# Patient Record
Sex: FEMALE | Race: WHITE | NOT HISPANIC OR LATINO
[De-identification: names, ages, dates, MRNs, and addresses within clinical notes are randomized per-mention and may not be internally consistent; named-entity substitution may affect disease eponyms.]

---
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
## 2018-06-12 ENCOUNTER — COMMUNICATION - HEALTHEAST (OUTPATIENT)
Dept: TELEHEALTH | Facility: CLINIC | Age: 18
End: 2018-06-12

## 2018-06-12 ENCOUNTER — OFFICE VISIT - HEALTHEAST (OUTPATIENT)
Dept: FAMILY MEDICINE | Facility: CLINIC | Age: 18
End: 2018-06-12

## 2018-06-12 DIAGNOSIS — J30.2 SEASONAL ALLERGIES: ICD-10-CM

## 2018-06-12 DIAGNOSIS — F41.9 ANXIETY: ICD-10-CM

## 2018-06-12 DIAGNOSIS — R51.9 HEADACHE: ICD-10-CM

## 2018-06-12 LAB — TSH SERPL DL<=0.005 MIU/L-ACNC: 1.21 UIU/ML (ref 0.3–5)

## 2018-06-12 ASSESSMENT — MIFFLIN-ST. JEOR: SCORE: 1337.51

## 2018-06-13 ENCOUNTER — COMMUNICATION - HEALTHEAST (OUTPATIENT)
Dept: FAMILY MEDICINE | Facility: CLINIC | Age: 18
End: 2018-06-13

## 2018-06-19 ENCOUNTER — RECORDS - HEALTHEAST (OUTPATIENT)
Dept: ADMINISTRATIVE | Facility: OTHER | Age: 18
End: 2018-06-19

## 2018-10-24 ENCOUNTER — OFFICE VISIT - HEALTHEAST (OUTPATIENT)
Dept: FAMILY MEDICINE | Facility: CLINIC | Age: 18
End: 2018-10-24

## 2018-10-24 DIAGNOSIS — F41.9 ANXIETY: ICD-10-CM

## 2018-10-24 DIAGNOSIS — F32.A DEPRESSION: ICD-10-CM

## 2018-11-01 ENCOUNTER — COMMUNICATION - HEALTHEAST (OUTPATIENT)
Dept: FAMILY MEDICINE | Facility: CLINIC | Age: 18
End: 2018-11-01

## 2018-11-07 ENCOUNTER — OFFICE VISIT - HEALTHEAST (OUTPATIENT)
Dept: FAMILY MEDICINE | Facility: CLINIC | Age: 18
End: 2018-11-07

## 2018-11-07 DIAGNOSIS — F41.9 ANXIETY: ICD-10-CM

## 2018-11-15 ENCOUNTER — OFFICE VISIT - HEALTHEAST (OUTPATIENT)
Dept: FAMILY MEDICINE | Facility: CLINIC | Age: 18
End: 2018-11-15

## 2018-11-15 ENCOUNTER — OFFICE VISIT - HEALTHEAST (OUTPATIENT)
Dept: BEHAVIORAL HEALTH | Facility: CLINIC | Age: 18
End: 2018-11-15

## 2018-11-15 DIAGNOSIS — F41.9 ANXIETY: ICD-10-CM

## 2018-11-15 DIAGNOSIS — F41.0 PANIC ATTACKS: ICD-10-CM

## 2018-11-15 DIAGNOSIS — F41.1 GENERALIZED ANXIETY DISORDER: ICD-10-CM

## 2018-11-15 DIAGNOSIS — F41.0 PANIC ATTACK: ICD-10-CM

## 2018-11-15 DIAGNOSIS — F32.A DEPRESSION: ICD-10-CM

## 2018-11-15 DIAGNOSIS — F32.2 CURRENT SEVERE EPISODE OF MAJOR DEPRESSIVE DISORDER WITHOUT PSYCHOTIC FEATURES WITHOUT PRIOR EPISODE (H): ICD-10-CM

## 2018-11-15 RX ORDER — TRAZODONE HYDROCHLORIDE 50 MG/1
TABLET, FILM COATED ORAL
Qty: 60 TABLET | Refills: 1 | Status: SHIPPED | OUTPATIENT
Start: 2018-11-15

## 2018-11-21 ENCOUNTER — OFFICE VISIT - HEALTHEAST (OUTPATIENT)
Dept: BEHAVIORAL HEALTH | Facility: CLINIC | Age: 18
End: 2018-11-21

## 2018-11-21 DIAGNOSIS — F41.0 PANIC ATTACKS: ICD-10-CM

## 2018-11-21 DIAGNOSIS — F41.1 GENERALIZED ANXIETY DISORDER: ICD-10-CM

## 2018-11-21 DIAGNOSIS — F32.2 CURRENT SEVERE EPISODE OF MAJOR DEPRESSIVE DISORDER WITHOUT PSYCHOTIC FEATURES WITHOUT PRIOR EPISODE (H): ICD-10-CM

## 2018-12-05 ENCOUNTER — OFFICE VISIT - HEALTHEAST (OUTPATIENT)
Dept: BEHAVIORAL HEALTH | Facility: CLINIC | Age: 18
End: 2018-12-05

## 2018-12-05 ENCOUNTER — AMBULATORY - HEALTHEAST (OUTPATIENT)
Dept: BEHAVIORAL HEALTH | Facility: CLINIC | Age: 18
End: 2018-12-05

## 2018-12-05 ENCOUNTER — COMMUNICATION - HEALTHEAST (OUTPATIENT)
Dept: NURSING | Facility: CLINIC | Age: 18
End: 2018-12-05

## 2018-12-05 DIAGNOSIS — F32.2 CURRENT SEVERE EPISODE OF MAJOR DEPRESSIVE DISORDER WITHOUT PSYCHOTIC FEATURES WITHOUT PRIOR EPISODE (H): ICD-10-CM

## 2018-12-05 DIAGNOSIS — F41.1 GENERALIZED ANXIETY DISORDER: ICD-10-CM

## 2018-12-13 ENCOUNTER — AMBULATORY - HEALTHEAST (OUTPATIENT)
Dept: BEHAVIORAL HEALTH | Facility: CLINIC | Age: 18
End: 2018-12-13

## 2018-12-17 ENCOUNTER — AMBULATORY - HEALTHEAST (OUTPATIENT)
Dept: BEHAVIORAL HEALTH | Facility: CLINIC | Age: 18
End: 2018-12-17

## 2019-01-07 ENCOUNTER — COMMUNICATION - HEALTHEAST (OUTPATIENT)
Dept: TELEHEALTH | Facility: CLINIC | Age: 19
End: 2019-01-07

## 2019-01-07 ENCOUNTER — OFFICE VISIT - HEALTHEAST (OUTPATIENT)
Dept: FAMILY MEDICINE | Facility: CLINIC | Age: 19
End: 2019-01-07

## 2019-01-07 DIAGNOSIS — F41.9 ANXIETY: ICD-10-CM

## 2019-01-07 DIAGNOSIS — G44.209 MUSCLE CONTRACTION HEADACHE: ICD-10-CM

## 2019-01-07 DIAGNOSIS — F41.0 PANIC ATTACK: ICD-10-CM

## 2019-01-07 DIAGNOSIS — Z71.6 ENCOUNTER FOR TOBACCO USE CESSATION COUNSELING: ICD-10-CM

## 2019-01-07 RX ORDER — BUPROPION HYDROCHLORIDE 150 MG/1
150 TABLET ORAL EVERY MORNING
Qty: 30 TABLET | Refills: 1 | Status: SHIPPED | OUTPATIENT
Start: 2019-01-07

## 2019-01-07 RX ORDER — LORAZEPAM 0.5 MG/1
TABLET ORAL
Qty: 30 TABLET | Refills: 0 | Status: SHIPPED | OUTPATIENT
Start: 2019-01-07

## 2019-01-07 RX ORDER — NAPROXEN 500 MG/1
TABLET ORAL
Qty: 40 TABLET | Refills: 1 | Status: SHIPPED | OUTPATIENT
Start: 2019-01-07

## 2019-01-11 ENCOUNTER — OFFICE VISIT - HEALTHEAST (OUTPATIENT)
Dept: BEHAVIORAL HEALTH | Facility: CLINIC | Age: 19
End: 2019-01-11

## 2019-01-11 DIAGNOSIS — F32.2 CURRENT SEVERE EPISODE OF MAJOR DEPRESSIVE DISORDER WITHOUT PSYCHOTIC FEATURES WITHOUT PRIOR EPISODE (H): ICD-10-CM

## 2019-01-11 DIAGNOSIS — F41.1 GENERALIZED ANXIETY DISORDER: ICD-10-CM

## 2019-08-16 ENCOUNTER — AMBULATORY - HEALTHEAST (OUTPATIENT)
Dept: BEHAVIORAL HEALTH | Facility: CLINIC | Age: 19
End: 2019-08-16

## 2021-05-31 NOTE — PROGRESS NOTES
Discharge Summary    Session Type: Patient is presenting for an Individual session.    Dates of service 11/15/2018 to 1/16/2019  # Sessions completed: 3      Diagnosis at Intake  Generalized Anxiety Disorder  Major Depressive Disorder, single episode, severe              Historical Diagnoses: PTSD, Generalized Anxiety Disorder, Obsessive-Compulsive Disorder      Diagnosis at Discharge  1. Current severe episode of major depressive disorder without psychotic features without prior episode (H)    2. Generalized anxiety disorder    R/O Bipolar Disorder  R/O Psychosis unspecified      Progress toward goal 1: Decrease Anxiety  Unmet    Progress toward goal 2: Decrease depression  Unmet        Reason for discharge:Pt dropped out -stated she would be moving soon. Was a no-show and then never returned. Been inactive since January 2019. Likely moved as she reported planning on doing.     Prognosis at discharge:Poor    Recommendations and referrals at discharge: Follow up with PCP and take medications as prescribed.  Last session follow up was: patient to look into providers in [facility]. She knows a psychiatry office her father attends and plans to connect with them for an appointment. She also knows of a therapist office. We discussed the importance of scheduling appointments in advance so she has them established by the time she moves.           TODD Salgado     8/16/2019  3:43 PM

## 2021-06-01 VITALS — BODY MASS INDEX: 20.01 KG/M2 | WEIGHT: 124.5 LBS | HEIGHT: 66 IN

## 2021-06-02 VITALS — WEIGHT: 121 LBS | BODY MASS INDEX: 20.77 KG/M2

## 2021-06-02 VITALS — BODY MASS INDEX: 20.43 KG/M2 | WEIGHT: 119.02 LBS

## 2021-06-02 VITALS — BODY MASS INDEX: 19.91 KG/M2 | WEIGHT: 116 LBS

## 2021-06-02 VITALS — BODY MASS INDEX: 20.57 KG/M2 | WEIGHT: 118 LBS

## 2021-06-16 PROBLEM — Z78.9 USES BIRTH CONTROL: Status: ACTIVE | Noted: 2018-06-12

## 2021-06-16 PROBLEM — J30.2 SEASONAL ALLERGIES: Status: ACTIVE | Noted: 2018-06-12

## 2021-06-16 PROBLEM — F41.9 ANXIETY: Status: ACTIVE | Noted: 2018-06-12

## 2021-06-16 PROBLEM — F32.2 CURRENT SEVERE EPISODE OF MAJOR DEPRESSIVE DISORDER WITHOUT PSYCHOTIC FEATURES WITHOUT PRIOR EPISODE (H): Status: ACTIVE | Noted: 2018-11-20

## 2021-06-16 PROBLEM — F41.1 GENERALIZED ANXIETY DISORDER: Status: ACTIVE | Noted: 2018-11-20

## 2021-06-16 PROBLEM — F41.0 PANIC ATTACKS: Status: ACTIVE | Noted: 2018-11-20

## 2021-06-16 PROBLEM — R51.9 HEADACHE: Status: ACTIVE | Noted: 2018-06-12

## 2021-06-18 NOTE — PROGRESS NOTES
Subjective: Patient comes in the clinic for the first time she was born in [place] but lived in multiple areas as her father was in the [employer].  She went to high school in [place] most recently lived in [place] just moved to [place].  She works at [employer] she is outside some.    She has a history of some seasonal allergies uses over-the-counter eyedrops and loratadine    Is a history of headaches migraine in the past she uses occasional Excedrin Migraine.    She smokes she states of less than 1/2 pack/day for the last year no alcohol    No allergies to medicine    She has had no surgeries no hospitalizations.    Family history in mother for anxiety who is been on medications in the past also mother has had a thyroid cancer.    Father has anxiety and depression.    Patient's on double Provera shots for birth control    She has a younger sister and twin sister were both healthy.    Patient has a past history of some anxiety in high school, 10th through 12th grade she went to counseling was on some medicine.  She was on Lexapro and bupropion in the past.  She thinks she may have got a little dizziness from 1 of the medicines and she not sure how much they helped.  Although she does think it did help some she also thought the counseling helps some.    I had her fill out a SAW 7 that came back at 17.    No additional concerns or issues    Tobacco status: She  reports that she has been smoking.  She has been smoking about 0.25 packs per day. She has never used smokeless tobacco.    Patient Active Problem List    Diagnosis Date Noted     Seasonal allergies 06/12/2018     Headache 06/12/2018     Anxiety 06/12/2018     Birth control 06/12/2018       Current Outpatient Prescriptions   Medication Sig Dispense Refill     hydrOXYzine HCl (ATARAX) 25 MG tablet 1 po two times a day prn anxiety 30 tablet 0     sertraline (ZOLOFT) 100 MG tablet 1/2 po daily for 8 days then 1 po qd 30 tablet 1  "    No current facility-administered medications for this visit.        ROS: 10 point review of systems negative other than as outlined above    Objective:    /66 (Patient Site: Right Arm, Patient Position: Sitting, Cuff Size: Adult Regular)  Pulse 89  Temp 98.2  F (36.8  C) (Oral)   Resp 16  Ht 5' 5.75\" (1.67 m)  Wt 124 lb 8 oz (56.5 kg)  LMP 06/02/2018 (Exact Date)  SpO2 98% Comment: at rest with room air  BMI 20.25 kg/m2  Body mass index is 20.25 kg/(m^2).      General appearance no acute distress.    HEENT neck is supple oropharynx is clear pupils react normally extract movements full    Lungs clear no rales or rhonchi heart was regular in the 80s O2 sat 98%    No additional symptoms or concerns.    No results found for this or any previous visit.    Assessment:  1. Anxiety  sertraline (ZOLOFT) 100 MG tablet    Ambulatory referral to Psychology    Thyroid Stimulating Hormone (TSH)    hydrOXYzine HCl (ATARAX) 25 MG tablet   2. Headache     3. Seasonal allergies     4. Birth control       Anxiety we will treat with sertraline 100 mg half a day for 8 days and 1 a day thereafter recheck in 3-4 weeks    TSH pending    Use hydroxyzine as needed for more acute symptoms 25 mg 3 times daily as needed panic/anxiety attacks    Birth control stand Depo-Provera    Seasonal allergies okay to stay on eyedrops and loratadine.    Referral for counseling as well    Plan: As discussed above total time with the patient was 30 minutes, over 20 minutes was spent on counseling regarding her symptoms of anxiety treatment past history etc.    This transcription uses voice recognition software, which may contain typographical errors.  "

## 2021-06-21 NOTE — PROGRESS NOTES
Subjective: This patient comes in for evaluation this 18-year-old female was seen back on 10/24/2018 for anxiety and depression.    She was started on fluoxetine and trazodone.  In the past she has had problems on medication including sertraline and she thought Lexapro.  She also tried BuSpar without help    Patient had a high PHQ 9 and a high SAW 7 score at that time.    She was placed on fluoxetine 10 mg a day and increasing up to 20 mg a day after week also trazodone 50 mg 1/2-1 tablet at bedtime    She states that she is not sure if the trazodone was helping that much.  But she definitely felt like the fluoxetine started to give her some panic attacks.  She was seen at the emergency room at [facility] on October 30 and then up in [place] at [place] the following day.  She was treated with Ativan.  She was given some Ativan 0.5 mg to take 1 or 2 a day she is given 10 tablets and is now finished those.    She has had ongoing panic attacks especially at bedtime and sometimes she will wake up around 4-5 AM  She has a family history for anxiety.    Denies any suicidal thoughts or tendencies.    Having difficulty working.  Seems to have more symptoms at night.    She does have an appointment later in the month for diagnostic assessment, we were able to move that up to November 15.    Tobacco status: She  reports that she has quit smoking. She smoked 0.25 packs per day. She has never used smokeless tobacco.    Patient Active Problem List    Diagnosis Date Noted     Seasonal allergies 06/12/2018     Headache 06/12/2018     Anxiety 06/12/2018     Birth control 06/12/2018       Current Outpatient Prescriptions   Medication Sig Dispense Refill     famotidine (PEPCID) 20 MG tablet Take 1 tablet (20 mg total) by mouth 2 (two) times a day. 30 tablet 0     LORazepam (ATIVAN) 0.5 MG tablet 1 po two times a day as needed for panic attacks 20 tablet 0     PARoxetine (PAXIL) 20 MG tablet 1/2 po daily for 7 days then 1 po  qd 30 tablet 2     traZODone (DESYREL) 50 MG tablet Take 1 tablet (50 mg total) by mouth at bedtime. 30 tablet 1     No current facility-administered medications for this visit.        ROS:   10 point review of systems positive as outlined otherwise negative    Objective:    /68 (Patient Site: Left Arm, Patient Position: Sitting, Cuff Size: Adult Regular)  Pulse 76  Temp 97.7  F (36.5  C) (Oral)   Wt 119 lb 0.3 oz (54 kg)  LMP 10/08/2018 (Exact Date)  Breastfeeding? No  BMI 20.43 kg/m2  Body mass index is 20.43 kg/(m^2).    General appearance quiet    Vital signs are stable    Heart rate was 97 rate is regular    Lungs are clear no rales or rhonchi neck negative no adenopathy    Weight is down 6 pounds  from August.     Abdomen soft nontender no guarding rebound    Extremities negative no swelling        Assessment:  1. Anxiety  PARoxetine (PAXIL) 20 MG tablet    LORazepam (ATIVAN) 0.5 MG tablet     Ongoing anxiety as the main symptom    We will start Paxil 20 mg can initially started at a 10 mg dose for a week and then go up to 20 mg.    I did give her lorazepam 0.5 mg 1 twice daily as needed panic attack.  She understands that this is an addictive medicine and will not be used long-term.    I do think she should restart on her trazodone at bedtime    Plan: Await diagnostic assessment.  She does have a referral to psychiatry.  Follow-up in 2-3 weeks with me unless she gets into psychiatry quickly    This transcription uses voice recognition software, which may contain typographical errors.

## 2021-06-21 NOTE — PROGRESS NOTES
Subjective: This patient comes in for evaluation and follow-up.  She is a 18-year-old female who has a history of anxiety.  She was seen in June.  Was treated with sertraline and hydroxyzine at that time.  Unfortunately she got a headache from the sertraline after taking it for a short time and also felt like she had some side effects where she felt like she had to gasp for breath and felt jumpy just prior to sleep when she took hydroxyzine.    I referred her to psychology for counseling.  She states that where we sent her did not take her insurance, .    She got so busy she did not follow-up.  She felt like she was doing okay for a while but over the last couple months now has worsened.    Her symptoms are more in line with depression at this point.    Her SAW 7 score is 17 but PHQ 9 score is 23    On question #9 she scored a 2.  She states that in high school she had some self-harm behavior where she cut herself.  She has had some thoughts coming back regarding that and regarding hurting herself by cutting.  No suicidal thoughts.  She has no action plan.  She does feel safe.  We did discuss if she did not feel safe, would like her to go to [facility].  She said she would but reassured me that presently she is not in that situation.  Her graph there is a family history for anxiety in mom and anxiety and depression in dad.    Appearance had  1-2 years ago    She has a twin sister who does not have mental health and she has a younger sister who is been on some medicine for depression.    Patient continues to work.    Please see my initial and only visit with her back in June.    She now is having more difficulty with lower energy healing bad about herself and trouble concentrating little interest in things and feeling down and depressed and hopeless.    She did want to try different medication    I discussed with her I like her to see psychologist for diagnostic assessment as well as  individual psychotherapy.  And also have her see a psychiatrist.  We did start the process of getting her into see them.    In the meantime we discussed options for medication and elected to have her go on fluoxetine 20 mg a day she will start on half a tablet a day for a week and then go up to a whole tablet    Also will take trazodone 50 mg, 1/2-1 tablet at bedtime.    Discussed rationale behind treatment.        Tobacco status: She  reports that she has been smoking.  She has been smoking about 0.25 packs per day. She has never used smokeless tobacco.    Patient Active Problem List    Diagnosis Date Noted     Seasonal allergies 06/12/2018     Headache 06/12/2018     Anxiety 06/12/2018     Birth control 06/12/2018       Current Outpatient Prescriptions   Medication Sig Dispense Refill     famotidine (PEPCID) 20 MG tablet Take 1 tablet (20 mg total) by mouth 2 (two) times a day. 30 tablet 0     FLUoxetine (PROZAC) 20 MG tablet Take 1 tablet (20 mg total) by mouth daily. 30 tablet 1     traZODone (DESYREL) 50 MG tablet Take 1 tablet (50 mg total) by mouth at bedtime. 30 tablet 1     No current facility-administered medications for this visit.        ROS:   10 point review of systems positive as outlined otherwise negative    Objective:    /60 (Patient Site: Left Arm, Patient Position: Sitting, Cuff Size: Adult Regular)  Pulse 96  Wt 121 lb (54.9 kg)  LMP 10/08/2018 (Exact Date)  Breastfeeding? No  BMI 20.77 kg/m2  Body mass index is 20.77 kg/(m^2).  The following are part of a depression follow up plan for the patient:  under care of mental health team    General appearance reserved    Vital signs are stable    No additional exam was done.        Assessment:  1. Depression  Ambulatory referral to Psychology    Ambulatory referral to Psychiatry    FLUoxetine (PROZAC) 20 MG tablet    traZODone (DESYREL) 50 MG tablet   2. Anxiety  Ambulatory referral to Psychology    Ambulatory referral to Psychiatry     FLUoxetine (PROZAC) 20 MG tablet    traZODone (DESYREL) 50 MG tablet     Total time with patient 25 minutes over 20 minutes spent in counseling reviewing past history past medications, previous evaluation here    Also discussed plans and coordinated care.    Also discussed medication side effect profile risk-benefit    Plan: As outlined above.  We will have her follow-up here in 3-4 weeks for recheck    Call earlier if any problems    Discussed plan if symptoms worsen.  Please see above, being seen at [facility] emergency room.  This transcription uses voice recognition software, which may contain typographical errors.

## 2021-06-21 NOTE — PROGRESS NOTES
"Mental Health Visit Note    11/21/2018    Start time: 3:00pm    Stop Time: 3:57pm   Session # 1    Session Type: Patient is presenting for an Individual session.    Wendy Correa is a 18 y.o. female is being seen today for    Chief Complaint   Patient presents with     MH Follow Up     Patient presented for follow up psychotherapy visit to address symptoms of depression, anxiety and panic attacks.   .     New symptoms or complaints: None    Functional Impairment:   Personal: 4  Family: 4  Work: 3  Social:4    Clinical assessment of mental status:   Grooming: Well groomed  Attire: Appropriate  Age: Appears Stated  Behavior Towards Examiner: Cooperative  Motor Activity: anxious- fidgeting   Eye Contact: fleeting  Mood: Anxious  Affect: Congruent w/content of speech, Anxious and Depressed  Speech/Language: Within normal and Soft  Attention: Within normal  Concentration: Brief  Thought Process: Within normal  Thought Content: Hallucinations: Within noraml  Delusions: Within normal  Orientation: X 3  Memory: No Evidence of Impairment  Judgement: No Evidence of Impairment  Estimated Intelligence: Average  Demonstrated Insight: Adequate  Fund of Knowledge: adequate       Suicidal/Homicidal Ideation present: None Reported This Session. Denies SI/HI.    Patient's impression of their current status: Patient states she stopped taking the Venlafaxine that was prescribed by PCP because it made her vomit. She has taken Ativan every other day. She reports taking 2 last night. She reports experiencing body image concerns due to what she went through in high school. She also reports that anxiety has caused a decrease in appetite. She expresses a desire to gain weight. She states she doesn't have a fear of gaining weight. She has tried to gain weight. She reports the excessive cleaning she does is due to anxious agitation and restlessness. When she gets \"emotional,\" she cleans and scrubs things that are already clean. She has a " "typical cleaning routine she follows 2 times a week. Her racing thoughts are also related to anxiety and fears something awful will happen such as \"I'm going to have a stroke, seizure...what if I hit my head.\" She has fears that something cold be wrong with her health. She is afraid of heights, the dark, being alone, messing up, saying the wrong thing, her appearance/what people think. She denies any mood swings related to feelings of elation. She notes it is more of a feeling of being \"on edge\" She reports anxious or anger \"mood swings\" as she has a tendency to hold in anger. She has been lacking motivation and reports anhedonia. She notes it takes all of her energy to get out of bed. She has recently started a journal and has been researching how to work through panic attacks.     Therapist impression of patients current state: Patient presented for follow up individual psychotherapy visit. Patient was well-groomed, alert and oriented. Patient's body language included anxious agitation, as well as some hesitation and avoidance of eye contact at times. Patient presents with symptoms of anxiety and depression.  It appears that many of her presenting symptoms and problems are related to her anxiety. Based on her further explanation of restricted eating and weight loss, therapist has ruled out Anorexia. Therapist provided positive reinforcement for new journaling practice. In session, we explored other activities she could engage in when feeling anxious agitation or restlessness.   Therapist and patient worked on a working through panic attacks worksheet. Patient took it with her to further review and complete outside of session. She was able to understand the concept of not resisting the panic attacks, but having some acceptance and acknowledging the biological response that happens in her body. She will practice the coping skills for panic attacks over the next couple of weeks.       Type of psychotherapeutic " technique provided: Insight oriented, Client centered and relaxation techniques, education on neurology, skills for coping with panic attacks    Progress toward short term goals:Progress as expected, presented for therapy session, was open and engaged in session. Was receptive to learning new skills. Has been doing her own research as well in regards to managing anxiety- started journaling and plans to journal about panic attacks to increase awareness and understanding of triggers.    Review of long term goals: Not done at today's visit. Will develop initial long-term treatment plan at next session as this was the first follow up visit.     Diagnosis:   1. Generalized anxiety disorder    2. Current severe episode of major depressive disorder without psychotic features without prior episode (H)    3. Panic attacks        Plan and Follow up: Patient is scheduled for follow up psychotherapy on 12/05/2018      Discharge Criteria/Planning: Patient will continue with follow-up until therapy can be discontinued without return of signs and symptoms.    Flora Valdez LICSW 11/21/2018

## 2021-06-21 NOTE — PROGRESS NOTES
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 sitting across the room and noted that no one else saw him. She has moments of feeling paralyzed in her sleep. In high school, she saw \"shadow people\" and dreams of death and torture. She felt like she was lifted out of bed and could physically feel herself being lifted. She continues to report having the voice in her head that is mentioned above from previous therapy provider. The voice, \"Patch\" is her \"protector\" and has been with her since 6th grade. He talks to her about how she has a purpose and is the same age as her. She endorses disordered thinking, racing thoughts, and paranoia symptoms. Therapist highly suspects presents of psychotic disorder, but will require further assessment of patient's symptoms due to differential diagnoses. R/O Psychosis, unspecified. Differential diagnoses: Schizoaffective disorder; Schizophrenia; Delusional Disorder; Dissociative Identity Disorder.       Recommendations   Patient would benefit from continued psychotherapy services every 1-2 weeks to further address presenting symptoms and concerns. Patient may also benefit from the following services and referrals:     Psychotherapy    Psychiatry- " psychotropic medication management    Neurology - headaches.     Neuropsych evaluation or psychological testing    Relaxation skills to manage anxiety and panic attacks    Sleep study - insomnia and nightmares    Physical activity for anxiety management       Diagnosis  Generalized Anxiety Disorder  Major Depressive Disorder, single episode, severe   Historical Diagnoses: PTSD, Generalized Anxiety Disorder, Obsessive-Compulsive Disorder    Provisional Diagnosis   R/O Panic Disorder   R/O Agoraphobia  R/O Psychosis, unspecified. Differential diagnoses: Schizoaffective disorder; Schizophrenia; Delusional Disorder. Dissociative Identify Disorder.  R/O Eating Disorder    Sources/references used in completing this assessment:   -Face to face interview  -Patient Chart  -Measures completed: WHODAS, PHQ-9, SAW-7, C-SSRS, CAGE   WHODAS 2.0 12-item version: 16 or 33%  Scores presented in qualifiers to represent level of disability.  MODERATE Problem (medium, fair, ...) 25-49%  H1= 25  H2= 12  H3= 15    PHQ-9: 20   SAW-7: 20  Mood (Current): 3  C-SSRS: Moderate risk  CAGE: 0/4      Assessment of client resolving presenting mental health concerns:  Ability  [] low     [x] average     [] high  Motivation [] low     [x] average     [] high  Willingness [] low     [x] average     [] high    Initial Assessment Objectives   1. Patient and therapist will develop therapeutic relationship.  2. Patient to present for follow up appointment to initiate psychotherapy services.  3. Patient to continue to follow up with primary care physician as needed and take medications as prescribed.  4. Develop comprehensive treatment plan.   5. Refer to psychiatry services for medication management. Specialty  was assisting her in getting scheduled for psychiatry with a community clinic today.       Is patient's family involved in the treatment?  [x] No     [] Yes  If yes, How?  Patient did not request family involvement at this time.    If  no, Why?  Patient did not request family involvement at this time.        Therapist s Signature/Supervision/co-signature statement:   Flora Valdez, LICSW 11/15/2018

## 2021-06-21 NOTE — PROGRESS NOTES
"Subjective: Patient comes in for follow-up.  She has had some ongoing problems with her anxiety.  Please see previous discussion regarding panic attacks.  We had her continue on trazodone use Ativan 0.5 mg twice daily and take paroxetine.  She only took it for a week she started that on 11/7/2018 she is been off of the 2 days.  She felt \"out of body \"when she was on it.    She is now failed a number of SSRIs including sertraline Lexapro fluoxetine and Paxil.  Mainly because of side effects.  She also thinks she is been on BuSpar in the past    She does see counseling here today and we do have a referral in for her to see a psychiatrist.    She is continued to have a panic attacks every other day.  It can occur anytime mostly at night but sometimes at work and will take a couple hours for her to calm down.    After discussion elected to refill the Ativan 20 tablets continue trazodone at bedtime start on venlafaxine 37.5 mg 1 a day for 7 days then go to 2 a day.  Recheck in 3 weeks here if she has not seen a psychiatrist by then    Tobacco status: She  reports that she has quit smoking. She smoked 0.25 packs per day. she has never used smokeless tobacco.    Patient Active Problem List    Diagnosis Date Noted     Seasonal allergies 06/12/2018     Headache 06/12/2018     Anxiety 06/12/2018     Birth control 06/12/2018       Current Outpatient Medications   Medication Sig Dispense Refill     LORazepam (ATIVAN) 0.5 MG tablet 1 po two times a day as needed for panic attacks. 20 tablet 0     traZODone (DESYREL) 50 MG tablet 1-2 po qhs. 60 tablet 1     famotidine (PEPCID) 20 MG tablet Take 1 tablet (20 mg total) by mouth 2 (two) times a day. 30 tablet 0     venlafaxine (EFFEXOR XR) 37.5 MG 24 hr capsule 1 po daily for 7 days then 2 po qd. 60 capsule 1     No current facility-administered medications for this visit.        ROS: Review of systems positive as outlined above otherwise negative    Objective:    /64 (Patient " Site: Right Arm, Patient Position: Sitting, Cuff Size: Adult Regular)   Pulse 72   Wt 116 lb (52.6 kg)   BMI 19.91 kg/m    Body mass index is 19.91 kg/m .      No additional exam was done        Assessment:  1. Anxiety  venlafaxine (EFFEXOR XR) 37.5 MG 24 hr capsule    LORazepam (ATIVAN) 0.5 MG tablet    traZODone (DESYREL) 50 MG tablet   2. Panic attack  venlafaxine (EFFEXOR XR) 37.5 MG 24 hr capsule    LORazepam (ATIVAN) 0.5 MG tablet   3. Depression  traZODone (DESYREL) 50 MG tablet     Total time with patient 15 minutes entire time spent in counseling reviewing medication side effects treatment options for her anxiety and panic attacks    Plan: Please see above    This transcription uses voice recognition software, which may contain typographical errors.

## 2021-06-22 NOTE — PROGRESS NOTES
Mental Health Visit Note    12/5/2018    Start time: 2:00pm    Stop Time: 3:40pm   Session # 2    Session Type: Patient is presenting for an Individual session.    Wendy Correa is a 18 y.o. female is being seen today for    Chief Complaint   Patient presents with     MH Follow Up     Patient presented for follow up psychotherapy to address symptoms of depression and anxiety with cutting and SI.    .     New symptoms or complaints: suicidal    Functional Impairment:   Personal: 4  Family: 4  Work: 3  Social:4    Clinical assessment of mental status:   Grooming: Well groomed  Attire: Appropriate  Age: Appears Stated  Behavior Towards Examiner: Cooperative  Motor Activity: Within normal   Eye Contact: downcast  Mood: Anxious  Affect: Congruent w/content of speech, Flat and Depressed  Speech/Language: Within normal and Soft  Attention: Within normal  Concentration: Brief  Thought Process: Within normal  Thought Content: Hallucinations: Within noraml  Delusions: Within normal  Orientation: X 3  Memory: No Evidence of Impairment  Judgement: No Evidence of Impairment  Estimated Intelligence: Average  Demonstrated Insight: Adequate  Fund of Knowledge: adequate       Suicidal/Homicidal Ideation present: Yes: reports SI. Plan to overdose on pills when boyfriend is gone and also cut straight up her arm. Reports she will have full intent if she feels she can't take it anymore. However, unable to say when her breaking point will be. PAtient does not contract for safety. She also noted preparotry behavior of planning to write a goodbye/apology letter . Updated C-SSRS and assessed suicide severity. View additional document for crisis assessment/transport hold..     Patient's impression of their current status: Patient states she stopped all of her psychotropic medications. She is out of the Ativan. She has had 4 panic attacks since our last visit. She is unable to identify triggers. She endorsed the following symptoms of  "panic: dizziness, increased heart rate, shortness of breath, tingling. She dmitri by talking to her friend who tell her she is safe and helps ground her. Patient reports her depression was \"really bad\" on Saturday and she engaged in self-harm (cutting). She was \"stuck in my head\" with \"self-hate\" and thought of not being enough. She reports that she is not happy.  She doesn't feel like she can talk to her boyfriend about her mental health as he says things that are \"shaming.\" Her depression is worse when she is alone and not doing anything. She asked therapist what she should do \"if Saturday happens again?\" She wonders where she can go because she doesn't trust herself in regards to her safety. She would like to fix her car, end her unhappy relationship, go to college, get her own place and focus on herself and bettering her life. She was able to identify some enjoyable activities of art and reading. However, she destroyed her art project on Saturday when she was dysregulated and can't concentrate enough to read.     Therapist impression of patients current state: Patient presented for follow up individual psychotherapy visit. Patient appeared depressed and affect was flat. 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 She expressed trust in the therapeutic relationship by openly sharing about her thoughts and feelings, including worsening depression and suicidal thoughts. She appears to be struggling to cope with her intrusive negative thoughts. We reviewed 3 supports she could reach out to for help when needed as well as additional mental health crisis supports. She stated 2 of those friends aren't always available. She completed a safety plan including warning signs, coping strategies, and supports she can contact. She took the copy with her to have available when symptoms are severe (therapist forgot to make a photocopy to scan into EMR). We discussed taking a proactive approach by scheduling dates with " friends or other activities when she knows she will be home alone and stuck in her thoughts, such as on the weekends when she is off work. She may beTherapist is concerned about patient's safety due to her need to stay overnight with a friend on Saturday due to cutting behaviors and suicidal thoughts and plan. It is also concerning that patient does not feel supported by her boyfriend with whom she resides. Therefore, she is not likely to reach out to him for help and support. Therapist and patient aligned to get patient further assessment and support at the ED. Patient showed trust in the therapeutic relationship and a willingness to seek help to manage her symptoms. She may benefit from partial hospitalization or at least further hospitalization for stabilization on medications as she is no longer taking any psychotropic medications.     Type of psychotherapeutic technique provided: Insight oriented, Client centered and relaxation techniques, education on neurology, skills for coping with panic attacks    Progress toward short term goals:Progress as expected, presented for therapy session, was open and engaged in session. Was receptive to learning new skills. Has been doing her own research as well in regards to managing anxiety- started journaling and plans to journal about panic attacks to increase awareness and understanding of triggers.    Review of long term goals: Treatment Plan updated and Effective 12/05/2018-03/05/2019.     Diagnosis:   1. Current severe episode of major depressive disorder without psychotic features without prior episode (H)    2. Generalized anxiety disorder          Plan and Follow up: Patent was taken to [facility] for further crisis evaluation and is under their care. Patient is scheduled for follow up psychotherapy on 12/13/2018, 12/28/18.      Discharge Criteria/Planning: Patient will continue with follow-up until therapy can be discontinued without return of signs and  symptoms.    Flora Valdez [facility] 12/5/2018

## 2021-06-22 NOTE — PROGRESS NOTES
"Patient reports suicidal ideation. Provider assessed suicide severity. Patient verbalizes intent to act with a plan for self-harm. Reported a suicide plan of overdosing on the pills she has at home when her boyfriend is gone from the house. She also has been engaging in cutting and contemplating cutting straight up her arm in an attempt of suicide. She is unable to contract for safety. She expressed that she is unsure when she will reach her \"breaking point.\" She is worried about following through on the above mentioned plan. She said, \"I will have the full intent (to commit suicide) if I don't feel like I can take anymore.\" She has been thinking about writing a goodbye and apology letter \"for what I've done and not done\" as a preparatory behavior for suicide. Provider and patient discussed getting further evaluation and support for her. She was receptive of the idea and willing to go to the ED for further assessment. Therapist would like patient assessed for safety due to reported SI and severe depression symptoms. Provider followed mental health emergency response process and procedures. 911 was called, a transport hold was completed, and patient was taken to [facility] by emergency responders. Patient went without incident. She endorsed symptoms of anxiety when responders arrived and was supported by therapist in working through anxiety and transferring care to the EMS. Provider contacted ED physician and ED crisis social workers to notify of suicidal patient arriving in ambulance for further crisis evaluation and possible hospital admit for stabilization. She is not on any psychotropic medications at this time as she stopped Effexor and Trazodone and ran out of Ativan. A transport hold, face sheet and med list was provided to the EMS to share with ED.      "

## 2021-06-22 NOTE — PROGRESS NOTES
[facility] outpatient mental health schedulers unable to contact patient about rescheduling psychiatry. Patient did not answer and her VM is full. Therapist and patient will have to discuss in person when patient returns for therapy visit.

## 2021-06-22 NOTE — PROGRESS NOTES
"Patient was a same-day cancellation/no-show for psychotherapy today. Therapist also noticed patient cancelled her outpatient psychiatry appointment and another psychotherapy appointment. Called patient to follow up as she has not been seen by provider since she was sent to the ED with concerns about her safety due to suicidality. Spoke with patient who reports she cancelled her appointments as she will be going out of town for the holidays. She is visiting family, which she is looking forward to. She was not prescribed any medications while in the ED to help manage her symptoms of depression or anxiety. Therefore, she is currently not on any medications. She stated she \"doesn't have time\" to call the doctor for a refill or be seen by primary. She expressed a desire to continue therapy and will follow up with this provider on her 1/11/19 scheduled appointment. She did not report any concerns about her safety today. She sounded alert, oriented, and calm. Therapist highly recommended the psychiatry appointment be rescheduled- she was agreeable. Message sent to [facility] scheduling requesting assistance with rescheduling patient with psychiatry through [facility]. Flora BROOKS  "

## 2021-06-22 NOTE — PROGRESS NOTES
Therapist was informed by [facility] scheduling staff that at this time, they are unable to reschedule patient with the same provider, Rocky PERSON, for psychiatry services. Will likely need to follow up on psychiatry once patient returns to therapy.

## 2021-06-22 NOTE — PROGRESS NOTES
18 at 2:57PM:   Received IM/message (FYI-below) from karli Hines that pt have suicidal ideation.   Writer called 911 line. Spoke with  103 (FYI: no name shared). Writer name is provide. Reason of called is shared.  and writer conference called to ambulance dispatch team. Pt full name, , and reason of called is shared.  Ambulance team of 4 staff and one  arrived. Facesheet and med list is print and given to the ambulance team. Psychotherapist is informed via face to face.   Writer remain with pt, 3 ambulance staff, and the officer. Flora, psychotherapist and main ambulance staff/ is busy with report/questions.   Per prefer going to [facility] per pt per one of the male ambulance staff asked.     FYI:  [2018 2:57 PM] Flora Valdez A:   Wendy  suicida  l  no danger to others  has engaged in self-harm cutting behavior on Saturday- severe depressed and thought about killing self.   [2018 2:57 PM] Flora Valdez A:   Has some plans and also planned to write a letter   [2018 3:04 PM] Chela Louis:   Flora per ? is the pt alert right now?   does pt have any weapon?   [2018 3:04 PM] Flora Valdez:   yes  no weapon.    Plan:   Patient is not a [facility] pt. No future follow up.

## 2021-06-23 NOTE — PROGRESS NOTES
Mental Health Visit Note    1/11/2019    Start time: 3:00pm    Stop Time: 4:08pm   Session # 1    Session Type: Patient is presenting for an Individual session.    Wendy Correa is a 18 y.o. female is being seen today for    Chief Complaint   Patient presents with     MH Follow Up     Patient presented for follow up psychotherapy to address symptoms of depression and anxiety that impact daily functioning   .     New symptoms or complaints: anger    Functional Impairment:   Personal: 4  Family: 4  Work: 3  Social:4    Clinical assessment of mental status:   Grooming: Well groomed  Attire: Appropriate  Age: Appears Stated  Behavior Towards Examiner: Cooperative  Motor Activity: Within normal   Eye Contact: downcast  Mood: Euthymic some moments of anxiety and anger  Affect: Congruent w/content of speech  Speech/Language: Within normal and Soft  Attention: Within normal  Concentration: Within normal  Thought Process: Within normal  Thought Content: Hallucinations: Within noraml  Delusions: Within normal  Orientation: X 3  Memory: No Evidence of Impairment  Judgement: No Evidence of Impairment  Estimated Intelligence: Average  Demonstrated Insight: Adequate  Fund of Knowledge: adequate       Suicidal/Homicidal Ideation present: None Reported This Session Denies any SI/HI. No self-harm behaviors.     Patient's impression of their current status: Patient reports starting Wellbutrin prescription on Monday. She stopped taking it effective today because it was making her angry. However, she felt it helped her anxiety and over thinking. She also went 5 days without smoking. She has had 1 panic attack this week rather than having them daily. She continues to have Ativan for panic attacks. She reports her depression feels stable. She does acknowledge having anger in the past. She reports working on anger management in high school. Examples of anger moments lately include when a coworker tells her to do something a certain  "way or she is bothered by her boyfriend's loud chewing or she can't hear the TV. She reports irritability. She dmitri by taking a hot bath to help manage anxiety. She shared that she got engaged since our last session and has plans to move back to [place]. No self harm. Lots of positive changes.     Therapist impression of patients current state: Patient presented for follow up individual psychotherapy visit. She presents with anxious mood, but it has improved significantly. She is calm in session- no anxious agitation with her body. As she reports history of anger concerns, it may not be related to the medication she started. Therapist consulted with PharmD at clinic re: reported symptom of anger lately since starting Wellbutrin on Monday. She has tried so many different medications that she hasn't liked due to side effects. The other recommendation the PharmD had would be to try duloxetine as there may be less nausea side effects than other SNRIs she has taken. However, she also hasn't spent much time staying on a medication to see if the side effects subside.  There maybe some irritability regarding feeling out of control or the unknown with moving and changes. As she reports some other improvements with the medication, she may continue trying to take it as she has improvement with anxiety and smoking cessation. However, if anger worsens or she no longer feels safe to herself or others, she will stop taking it. Therapist recommended a follow up with PCP who is the prescriber just to review the concerns she brought up today. We also reviewed that her father has a history of bipolar depression. Therapist had patient completed Mood Disorder (current) questionnaire. She answered \"yes\" to 9/13 questions which is indicative of a positive screen for Bipolar Disorder- further assessment is warranted. PHQ-9 score =11 today, which has decreased. SAW-7 = 7 today, which has significantly decreased. No history of ADHD diagnosis. " In regards to her anger/irritability, shifts in mood, differential diagnoses could include Bipolar Disorder or Borderline Personality Disorder based on the examples she provided (interpersonal interactions). It may be helpful for her to wait to establish care with psychiatrist to further explore medication changes as she does have complex mental health symptoms that also include some mood changes and reported hallucinations and what appear to be delusions  Review from my diagnostic assessment:   Therapist highly suspects presents of psychotic disorder, but will require further assessment of patient's symptoms due to differential diagnoses. R/O Psychosis, unspecified. Differential diagnoses: Schizoaffective disorder; Schizophrenia; Delusional Disorder; Dissociative Identity Disorder.   Therefore, this may also be related to the difficulty of finding the correct medication for her (noted to PCP)      Type of psychotherapeutic technique provided: Insight oriented, Client centered, Solution-focused and motivational interviewing    Progress toward short term goals:Progress as expected, coping through hot baths and journaling. Decreased panic attacks and improvement of depression. However, poor medication adherence/challenges.    Prior to next session, patient to look into providers in [facility]. She knows a psychiatry office her father attends and plans to connect with them for an appointment. She also knows of a therapist office. We discussed the importance of scheduling appointments in advance so she has them established by the time she moves.     Review of long term goals: Not done at today's visit and Effective 12/05/2018-03/05/2019.     Diagnosis:   1. Current severe episode of major depressive disorder without psychotic features without prior episode (H)    2. Generalized anxiety disorder    R/O Bipolar Disorder  R/O Psychosis unspecified      Plan and Follow up: Astrid has a follow up psychotherapy appointment for  1/16/19.       Discharge Criteria/Planning: Patient will continue with follow-up until therapy can be discontinued without return of signs and symptoms.    Flora Valdez LICSW 1/11/2019

## 2021-06-27 ENCOUNTER — HEALTH MAINTENANCE LETTER (OUTPATIENT)
Age: 21
End: 2021-06-27

## 2021-10-17 ENCOUNTER — HEALTH MAINTENANCE LETTER (OUTPATIENT)
Age: 21
End: 2021-10-17

## 2022-07-24 ENCOUNTER — HEALTH MAINTENANCE LETTER (OUTPATIENT)
Age: 22
End: 2022-07-24

## 2022-10-02 ENCOUNTER — HEALTH MAINTENANCE LETTER (OUTPATIENT)
Age: 22
End: 2022-10-02

## 2023-08-12 ENCOUNTER — HEALTH MAINTENANCE LETTER (OUTPATIENT)
Age: 23
End: 2023-08-12